# Patient Record
Sex: FEMALE | Race: WHITE | URBAN - METROPOLITAN AREA
[De-identification: names, ages, dates, MRNs, and addresses within clinical notes are randomized per-mention and may not be internally consistent; named-entity substitution may affect disease eponyms.]

---

## 2019-09-24 ENCOUNTER — NEW PATIENT (OUTPATIENT)
Dept: URBAN - METROPOLITAN AREA CLINIC 64 | Facility: CLINIC | Age: 49
End: 2019-09-24
Payer: COMMERCIAL

## 2019-09-24 DIAGNOSIS — H52.13 MYOPIA, BILATERAL: Primary | ICD-10-CM

## 2019-09-24 PROCEDURE — 92004 COMPRE OPH EXAM NEW PT 1/>: CPT | Performed by: OPTOMETRIST

## 2019-09-24 ASSESSMENT — VISUAL ACUITY
OS: 20/20
OD: 20/20

## 2019-09-24 ASSESSMENT — INTRAOCULAR PRESSURE
OD: 11
OS: 9

## 2019-09-24 ASSESSMENT — KERATOMETRY
OS: 45.67
OD: 45.00

## 2022-02-14 ENCOUNTER — OFFICE VISIT (OUTPATIENT)
Dept: URBAN - METROPOLITAN AREA CLINIC 64 | Facility: CLINIC | Age: 52
End: 2022-02-14
Payer: COMMERCIAL

## 2022-02-14 DIAGNOSIS — H53.8 OTHER VISUAL DISTURBANCES: Primary | ICD-10-CM

## 2022-02-14 PROCEDURE — 99213 OFFICE O/P EST LOW 20 MIN: CPT | Performed by: OPTOMETRIST

## 2022-02-14 ASSESSMENT — INTRAOCULAR PRESSURE
OD: 19
OS: 19

## 2022-02-14 ASSESSMENT — KERATOMETRY
OD: 44.73
OS: 45.52

## 2022-02-14 ASSESSMENT — VISUAL ACUITY
OD: 20/20
OS: 20/20

## 2022-02-14 NOTE — IMPRESSION/PLAN
Impression: Other visual disturbances: H53.8. Plan: Blurry night vision is from mild uncorrected myopia. Discussed glasses. We decided to observe for now. Hx of recent [facility], unsure which eye. Resolves. Monitor.

## 2024-03-22 ENCOUNTER — OFFICE VISIT (OUTPATIENT)
Dept: URBAN - METROPOLITAN AREA CLINIC 64 | Facility: LOCATION | Age: 54
End: 2024-03-22
Payer: COMMERCIAL

## 2024-03-22 DIAGNOSIS — H25.13 AGE-RELATED NUCLEAR CATARACT, BILATERAL: Primary | ICD-10-CM

## 2024-03-22 PROCEDURE — 99214 OFFICE O/P EST MOD 30 MIN: CPT

## 2024-03-22 ASSESSMENT — INTRAOCULAR PRESSURE
OS: 11
OD: 19

## 2024-03-22 ASSESSMENT — KERATOMETRY
OS: 45.64
OD: 45.00